# Patient Record
Sex: MALE | Race: WHITE
[De-identification: names, ages, dates, MRNs, and addresses within clinical notes are randomized per-mention and may not be internally consistent; named-entity substitution may affect disease eponyms.]

---

## 2018-05-26 ENCOUNTER — HOSPITAL ENCOUNTER (OUTPATIENT)
Dept: HOSPITAL 53 - M ADAMS | Age: 68
End: 2018-05-26
Attending: PHYSICIAN ASSISTANT
Payer: MEDICARE

## 2018-05-26 DIAGNOSIS — J20.9: Primary | ICD-10-CM

## 2018-05-26 DIAGNOSIS — R06.02: ICD-10-CM

## 2018-05-26 PROCEDURE — 71046 X-RAY EXAM CHEST 2 VIEWS: CPT

## 2019-03-19 ENCOUNTER — HOSPITAL ENCOUNTER (OUTPATIENT)
Dept: HOSPITAL 53 - M LAB REF | Age: 69
End: 2019-03-19
Attending: PHYSICIAN ASSISTANT
Payer: MEDICARE

## 2019-03-19 ENCOUNTER — HOSPITAL ENCOUNTER (OUTPATIENT)
Dept: HOSPITAL 53 - M ADAMS | Age: 69
End: 2019-03-19
Attending: PHYSICIAN ASSISTANT
Payer: MEDICARE

## 2019-03-19 DIAGNOSIS — J20.9: Primary | ICD-10-CM

## 2019-03-19 LAB
BASOPHILS # BLD AUTO: 0 10^3/UL (ref 0–0.2)
BASOPHILS NFR BLD AUTO: 0.2 % (ref 0–1)
EOSINOPHIL # BLD AUTO: 0 10^3/UL (ref 0–0.5)
EOSINOPHIL NFR BLD AUTO: 0 % (ref 0–3)
HCT VFR BLD AUTO: 52.4 % (ref 42–52)
HGB BLD-MCNC: 17.2 G/DL (ref 13.5–17.5)
LYMPHOCYTES # BLD AUTO: 1.4 10^3/UL (ref 1.5–4.5)
LYMPHOCYTES NFR BLD AUTO: 17.2 % (ref 24–44)
MCH RBC QN AUTO: 33.3 PG (ref 27–33)
MCHC RBC AUTO-ENTMCNC: 32.8 G/DL (ref 32–36.5)
MCV RBC AUTO: 101.4 FL (ref 80–96)
MONOCYTES # BLD AUTO: 0.6 10^3/UL (ref 0–0.8)
MONOCYTES NFR BLD AUTO: 7.1 % (ref 0–5)
NEUTROPHILS # BLD AUTO: 6.1 10^3/UL (ref 1.8–7.7)
NEUTROPHILS NFR BLD AUTO: 75.1 % (ref 36–66)
PLATELET # BLD AUTO: 181 10^3/UL (ref 150–450)
RBC # BLD AUTO: 5.17 10^6/UL (ref 4.3–6.1)
WBC # BLD AUTO: 8.1 10^3/UL (ref 4–10)

## 2019-03-19 NOTE — REP
Chest two views

 

HISTORY: Acute bronchitis

 

Comparison: 05/26/2018

 

The lungs are clear. There is blunting of the left costophrenic angle due to

pleural thickening. The heart is normal in size.  The pulmonary vasculature is

normal in appearance.  The bony structure is intact.

 

IMPRESSION:  No acute disease.

 

 

Electronically Signed by

Mirza Fox MD 03/19/2019 12:03 P

## 2020-05-06 ENCOUNTER — HOSPITAL ENCOUNTER (EMERGENCY)
Dept: HOSPITAL 53 - M ED | Age: 70
Discharge: HOME | End: 2020-05-06
Payer: MEDICARE

## 2020-05-06 VITALS — SYSTOLIC BLOOD PRESSURE: 141 MMHG | DIASTOLIC BLOOD PRESSURE: 94 MMHG

## 2020-05-06 VITALS — HEIGHT: 74 IN | WEIGHT: 260.54 LBS | BODY MASS INDEX: 33.44 KG/M2

## 2020-05-06 DIAGNOSIS — R94.5: ICD-10-CM
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
## 2020-05-06 DIAGNOSIS — F17.200: ICD-10-CM

## 2020-05-06 DIAGNOSIS — G43.909: ICD-10-CM

## 2020-05-06 DIAGNOSIS — M51.37: Primary | ICD-10-CM
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
## 2020-05-06 DIAGNOSIS — K21.9: ICD-10-CM

## 2020-05-06 DIAGNOSIS — E86.0: ICD-10-CM

## 2020-05-06 DIAGNOSIS — S39.012A: ICD-10-CM

## 2020-05-06 LAB
BASOPHILS # BLD AUTO: 0.1 10^3/UL (ref 0–0.2)
BASOPHILS NFR BLD AUTO: 0.5 % (ref 0–1)
BUN SERPL-MCNC: 23 MG/DL (ref 7–18)
CALCIUM SERPL-MCNC: 9.4 MG/DL (ref 8.8–10.2)
CHLORIDE SERPL-SCNC: 108 MEQ/L (ref 98–107)
CK SERPL-CCNC: 171 U/L (ref 39–308)
CO2 SERPL-SCNC: 25 MEQ/L (ref 21–32)
CREAT SERPL-MCNC: 1.61 MG/DL (ref 0.7–1.3)
EOSINOPHIL # BLD AUTO: 0.1 10^3/UL (ref 0–0.5)
EOSINOPHIL NFR BLD AUTO: 0.5 % (ref 0–3)
GFR SERPL CREATININE-BSD FRML MDRD: 45.1 ML/MIN/{1.73_M2} (ref 42–?)
GLUCOSE SERPL-MCNC: 116 MG/DL (ref 70–100)
HCT VFR BLD AUTO: 53.1 % (ref 42–52)
HGB BLD-MCNC: 17.8 G/DL (ref 13.5–17.5)
LYMPHOCYTES # BLD AUTO: 1.5 10^3/UL (ref 1.5–5)
LYMPHOCYTES NFR BLD AUTO: 12.3 % (ref 24–44)
MCH RBC QN AUTO: 33.4 PG (ref 27–33)
MCHC RBC AUTO-ENTMCNC: 33.5 G/DL (ref 32–36.5)
MCV RBC AUTO: 99.6 FL (ref 80–96)
MONOCYTES # BLD AUTO: 0.6 10^3/UL (ref 0–0.8)
MONOCYTES NFR BLD AUTO: 5.3 % (ref 0–5)
NEUTROPHILS # BLD AUTO: 9.7 10^3/UL (ref 1.5–8.5)
NEUTROPHILS NFR BLD AUTO: 80.8 % (ref 36–66)
PLATELET # BLD AUTO: 109 10^3/UL (ref 150–450)
POTASSIUM SERPL-SCNC: 4.3 MEQ/L (ref 3.5–5.1)
POTASSIUM SERPL-SCNC: 5.8 MEQ/L (ref 3.5–5.1)
RBC # BLD AUTO: 5.33 10^6/UL (ref 4.3–6.1)
SODIUM SERPL-SCNC: 140 MEQ/L (ref 136–145)
VIT B12 SERPL-MCNC: 448 PG/ML (ref 247–911)
WBC # BLD AUTO: 11.9 10^3/UL (ref 4–10)

## 2020-05-06 NOTE — REP
LUMBAR SPINE SERIES:  FIVE VIEWS.

 

HISTORY:  Low back pain and tingling in the feet.

 

FINDINGS:  Lumbar vertebral body heights are preserved.  There is straightening

of the normal lumbar lordosis.  There is degenerative disc narrowing at L3-4, and

to a lesser extent, at L4-5 with discogenic spurring noted, particularly at L3-4.

There is evidence of calcification of the margin of a posteriorly bulging disc at

L3-4, and lateral film suggests spinal stenosis at L3-4.  Similar less pronounced

changes seen at L2-3.  No spondylolysis or spondylolisthesis is seen.  There is

some facet hypertrophy at L5-S1 and L4-5 bilaterally, mild in degree.  Sacrum and

SI joints are intact.  Psoas margins are symmetric.  The abdominal aorta appears

tortuous and may be ectatic.  It is faintly calcified.  There are clips in the

right upper quadrant of the abdomen.

 

IMPRESSION:

 

1.  Degenerative disc disease.  Findings suggestive of spinal stenosis at L3-4

and possibly at L2-3 due to bulging discs.

 

2.  Tortuous and possibly ectatic, faintly calcified abdominal aorta.

 

3.  No acute bony abnormality.

 

 

Electronically Signed by

Rich Lee MD 05/06/2020 06:47 P

## 2020-05-07 NOTE — ED PDOC
Post-Departure Follow-Up


dr osgeuera faxed formal report of ls spine for fu mlg Lundborg-Gray,Maja MD           May 7, 2020 13:03

## 2020-05-10 ENCOUNTER — HOSPITAL ENCOUNTER (INPATIENT)
Dept: HOSPITAL 53 - M ED | Age: 70
LOS: 2 days | Discharge: HOME | DRG: 641 | End: 2020-05-12
Attending: INTERNAL MEDICINE | Admitting: INTERNAL MEDICINE
Payer: MEDICARE

## 2020-05-10 VITALS — DIASTOLIC BLOOD PRESSURE: 77 MMHG | SYSTOLIC BLOOD PRESSURE: 158 MMHG

## 2020-05-10 VITALS — SYSTOLIC BLOOD PRESSURE: 119 MMHG | DIASTOLIC BLOOD PRESSURE: 73 MMHG

## 2020-05-10 VITALS — HEIGHT: 74 IN | WEIGHT: 251.33 LBS | BODY MASS INDEX: 32.25 KG/M2

## 2020-05-10 DIAGNOSIS — R19.7: ICD-10-CM

## 2020-05-10 DIAGNOSIS — N17.9: ICD-10-CM

## 2020-05-10 DIAGNOSIS — F17.200: ICD-10-CM

## 2020-05-10 DIAGNOSIS — E86.0: Primary | ICD-10-CM

## 2020-05-10 DIAGNOSIS — M62.81: ICD-10-CM

## 2020-05-10 DIAGNOSIS — I71.6: ICD-10-CM

## 2020-05-10 DIAGNOSIS — E66.9: ICD-10-CM

## 2020-05-10 DIAGNOSIS — I73.9: ICD-10-CM

## 2020-05-10 LAB
ALBUMIN SERPL BCG-MCNC: 3.3 GM/DL (ref 3.2–5.2)
ALT SERPL W P-5'-P-CCNC: 52 U/L (ref 12–78)
BASOPHILS # BLD AUTO: 0 10^3/UL (ref 0–0.2)
BASOPHILS NFR BLD AUTO: 0.4 % (ref 0–1)
BILIRUB CONJ SERPL-MCNC: 0.2 MG/DL (ref 0–0.2)
BILIRUB SERPL-MCNC: 0.7 MG/DL (ref 0.2–1)
BUN SERPL-MCNC: 39 MG/DL (ref 7–18)
BUN SERPL-MCNC: 44 MG/DL (ref 7–18)
CALCIUM SERPL-MCNC: 8.6 MG/DL (ref 8.8–10.2)
CALCIUM SERPL-MCNC: 8.8 MG/DL (ref 8.8–10.2)
CHLORIDE SERPL-SCNC: 107 MEQ/L (ref 98–107)
CHLORIDE SERPL-SCNC: 111 MEQ/L (ref 98–107)
CHOLEST SERPL-MCNC: 172 MG/DL (ref ?–200)
CHOLEST/HDLC SERPL: 4.78 {RATIO} (ref ?–5)
CK MB CFR.DF SERPL CALC: 0.63
CK MB SERPL-MCNC: 1.4 NG/ML (ref ?–3.6)
CK SERPL-CCNC: 222 U/L (ref 39–308)
CO2 SERPL-SCNC: 25 MEQ/L (ref 21–32)
CO2 SERPL-SCNC: 26 MEQ/L (ref 21–32)
CREAT SERPL-MCNC: 2.42 MG/DL (ref 0.7–1.3)
CREAT SERPL-MCNC: 2.62 MG/DL (ref 0.7–1.3)
CREAT UR-MCNC: 201 MG/DL
EOSINOPHIL # BLD AUTO: 0.4 10^3/UL (ref 0–0.5)
EOSINOPHIL NFR BLD AUTO: 4.2 % (ref 0–3)
EST. AVERAGE GLUCOSE BLD GHB EST-MCNC: 111 MG/DL (ref 60–110)
GFR SERPL CREATININE-BSD FRML MDRD: 25.7 ML/MIN/{1.73_M2} (ref 42–?)
GFR SERPL CREATININE-BSD FRML MDRD: 28.2 ML/MIN/{1.73_M2} (ref 42–?)
GLUCOSE SERPL-MCNC: 104 MG/DL (ref 70–100)
GLUCOSE SERPL-MCNC: 97 MG/DL (ref 70–100)
HCT VFR BLD AUTO: 47.2 % (ref 42–52)
HDLC SERPL-MCNC: 36 MG/DL (ref 40–?)
HGB BLD-MCNC: 16.1 G/DL (ref 13.5–17.5)
LDLC SERPL CALC-MCNC: 108 MG/DL (ref ?–100)
LYMPHOCYTES # BLD AUTO: 2 10^3/UL (ref 1.5–5)
LYMPHOCYTES NFR BLD AUTO: 21.5 % (ref 24–44)
MAGNESIUM SERPL-MCNC: 2.1 MG/DL (ref 1.8–2.4)
MCH RBC QN AUTO: 33.1 PG (ref 27–33)
MCHC RBC AUTO-ENTMCNC: 34.1 G/DL (ref 32–36.5)
MCV RBC AUTO: 97.1 FL (ref 80–96)
MONOCYTES # BLD AUTO: 0.8 10^3/UL (ref 0–0.8)
MONOCYTES NFR BLD AUTO: 8.4 % (ref 0–5)
NEUTROPHILS # BLD AUTO: 6 10^3/UL (ref 1.5–8.5)
NEUTROPHILS NFR BLD AUTO: 65 % (ref 36–66)
NONHDLC SERPL-MCNC: 136 MG/DL
OSMOLALITY SERPL: 296 MOSM/KG (ref 280–301)
OSMOLALITY UR: 703 MOSM/KG (ref 500–800)
PLATELET # BLD AUTO: 127 10^3/UL (ref 150–450)
POTASSIUM SERPL-SCNC: 4.2 MEQ/L (ref 3.5–5.1)
POTASSIUM SERPL-SCNC: 4.6 MEQ/L (ref 3.5–5.1)
PROT SERPL-MCNC: 6.9 GM/DL (ref 6.4–8.2)
RBC # BLD AUTO: 4.86 10^6/UL (ref 4.3–6.1)
SODIUM SERPL-SCNC: 138 MEQ/L (ref 136–145)
SODIUM SERPL-SCNC: 142 MEQ/L (ref 136–145)
SODIUM UR-SCNC: 54 MEQ/L
TRIGL SERPL-MCNC: 138 MG/DL (ref ?–150)
TROPONIN I SERPL-MCNC: < 0.02 NG/ML (ref ?–0.1)
UUN UR-MCNC: 1247 MG/DL
WBC # BLD AUTO: 9.2 10^3/UL (ref 4–10)

## 2020-05-10 RX ADMIN — SODIUM CHLORIDE SCH MLS/HR: 9 INJECTION, SOLUTION INTRAVENOUS at 15:35

## 2020-05-10 RX ADMIN — SODIUM CHLORIDE SCH MLS/HR: 9 INJECTION, SOLUTION INTRAVENOUS at 15:34

## 2020-05-10 NOTE — REP
REASON:  Acute renal failure.

 

There are no priors for comparison.

 

Prior contrast-enhanced chest CT, 07/10/2010, was reviewed.

 

The descending thoracic aorta seen on this abdominal CT is massively ectatic, now

measuring 6.5 cm, previously under 3 cm on the imaged portion seen on the prior

exam. In addition, the aneurysm continues to just proximal to the aortoiliac

bifurcation. At the bifurcation, the maximal AP dimension is 3.4 cm. Without

intravenous contrast, I cannot assess properly for an acute aortic dissection. I

suspect there is an acute aortic dissection, but contrast would be necessary for

further evaluation.

 

Limited evaluation of the liver, spleen, pancreas, adrenal glands, and kidneys

show no gross abnormalities. Limited evaluation of the bowel loops and their

mesenteries show no gross abnormalities. No free fluid or free air is seen in the

abdomen or pelvis. There is sigmoid colon diverticulosis. There is no evidence of

an intrapelvic or intra-abdominal mass or adenopathy.

 

Bone window technique throughout the examination shows the osseous structures to

be within normal limits.

 

The lung bases are clear.

 

IMPRESSION:

 

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

aortic dissecting aneurysm. Contrast-enhanced examination is recommended. The

finding might be critical.

 

These findings were discussed with Manuel Sin, the patient's healthcare

provider at the time of this dictation.

 

A follow up phone call was placed to Mr. Sin who relayed that Dr. Noriega will evaluate the patient in the ER at this time and further

determination on whether they wish to order a contrast enhanced examination will

be made.

 

 

Electronically Signed by

Lance Rao DO 05/10/2020 03:39 P

## 2020-05-10 NOTE — REPVR
PROCEDURE INFORMATION: 

Exam: US Duplex Artery and Vein of the Abdominal and/or Reproductive Organs, 

Complete 

Exam date and time: 5/10/2020 5:37 PM 

Age: 70 years old 

Clinical indication: Abnormal findings; Abnormal lab test; Abnormal bun &/or 

creatinine; Additional info: [facility] 



TECHNIQUE: 

Imaging protocol: Real-time duplex ultrasound scan of the arterial and venous 

flow of the abdominal and/or reproductive organs with B-mode, color Doppler 

flow and spectral waveform analysis with image documentation. Exam focused on 

the region of clinical concern. Complete exam. Duplex images required to 

evaluate vascular conditions. 



COMPARISON: 

No relevant prior studies available. 



FINDINGS: 



FINDINGS:

Aortic Doppler: Aorta at the level of the renal arteries: PSV is 45 cm/s.



Right renal Doppler:

 The right kidney is 12.6 cm in length.

 Renal artery:

   Origin/proximal: PSV 74 cm/s. 

   Mid: PSV 70 cm/s. 

   Distal: PSV 61 cm/s. 

 Parenchyma: Segmental:  Resistive indices are between 0.65 and 0.69.

 Acceleration times are between 0.42 and 0.50 s. Rounded intrarenal waveforms.

 Renal vein: Patent.

  RAR is 1.6.



Left renal Doppler:

 The left kidney is 12.9 cm in length.

 Renal artery:

   Origin/proximal:  cm/s. 

   Mid:  cm/s. 

   Distal: PSV 95 cm/s. 

 Parenchyma: Segmental: Resistive indices are between and 0.61 and 0.65.

 Acceleration times are between 0.42 and 0.67 s. Rounded intrarenal waveforms.

 Renal vein: Patent.

 RAR is 2.7.



Reference:

 Renal artery peak systolic velocities (PSV). Normal is less than 180 cm/s.

 Resistive indices (RI). Normal value is ~0.60.  The upper limit of normal is 

0.70. 

 RAR (renal to aortic ratio). Normal is <3.5.



IMPRESSION: . 

No imaging findings to suggest renovascular hypertension.



Electronically signed by: Heriberto Hyde On 05/10/2020  18:08:55 PM

## 2020-05-10 NOTE — HPEPDOC
General


Date of Admission


5/10/2020


Date of Service:  May 10, 2020


Chief Complaint


The patient is a 70-year-old male who presented to the hospital for follow up 

lab work.





History of Present Illness


Patient is a 70-year-old  male with no significant past medical history

and has not seen a provider in greater than 10 years. . He initially presented 

to the ER on 5/6/2020 with complaints of worsening weakness and cramping of both

his lower extremities and back pain. 





Patient reports that at the time he was doing yard work in the backyard 

involving cutting tree branches manually. Patient noted that he was experiencing

cramping of his legs after he sat down, soon progressed to worsening weakness of

his lower extremities and pain. Patient came to the emergency room for further 

evaluation and workup. Upon arrival to emergency room, patient had reported that

his strength has improved and pain was resolving.





Patient had lab work completed that had revealed a creatinine of 1.6 and a 

normal CK level. Patient was advised to remain inpatient for IV fluid hydration.

However, he refused to stay and was advised to follow up for repeat lab work on 

Friday. 





Patient reported that on Thursday to Friday. He was experiencing diarrhea about 

5-10 episodes which prevented him from returning back for follow-up on Friday. 

His niece, who is a registered nurse advised him to return to the hospital for 

follow-up lab work today (Charlie 5/10). 





Upon arrival repeat lab work had revealed worsening creatinine to 2.6 with still

normal CK. Patient had reported that today he was experiencing some 

discoloration of his right second digit toe. Patient had a CT scan of his 

abdomen, pelvis completed without contrast to evaluate his kidneys; report was 

pending.





Hospitalist service was contacted for evaluation and admission for acute kidney 

injury.





Currently patient denies any headache, nausea, vomiting, chest pain, shortness 

of breath, any significant change in his baseline cough. He denies any 

palpitations. Denies any active diarrhea. Denies any constipation or urinary 

discomfort. Patient does report decreased urination. . He also notes that he 

hasnt been consuming much fluid over the last several days. Denies any fevers  

/ chills.





Patient has noted that for his back pain, over last several days, hes been 

taking Aleve, Excedrin and Tylenol. Currently, he reports that his back pain has

significantly improved but not resolved.





Patient reports his appetite is currently normal. Denies any significant changes

in his weight. He does not have a primary care provider is in the process of 

establishing care with Dr. Dumas.





After evaluation, preliminary results of CT scan became available and show 

possibility of dissecting aortic aneurysm. Case was discussed with Dr. Noriega and images were reviewed; unlikely that this is acute, however no 

emergent intervention at this time.





Home Medications


No Active Prescriptions or Reported Meds





Allergies


Coded Allergies:  


     No Known Allergies (Verified , 7/6/10)





Past Medical History


Medical History


Patient reports a remote history of pneumonia, pleurisy greater than 10 years 

ago


Surgical History


Cholecystectomy


Appendectomy





Family History


- Family history was reviewed and is currently noncontributory


- No history of malignancies





Social History


- Denies the use of alcohol, tobacco or illicit drugs


- Denies recent travel or sick contacts


- Lives with wife


- Occupation; owned a paving business





Review of Systems


Other systems


10 point review of systems complete, all negative otherwise stated in HPI





Vital Signs


- Vitals: /73, HR 73, RR 20, Sat 98%RA, Temp 97.9F


- General: Lying in bed, Does not appear to be in any distress, Speaking in full

sentences, AAOx3


- HEENT: NC, AT, PERRLA, EOMI


- CVS: RRR, +S1S2


- Lungs: Fair air entry bilaterally, No appreciable wheezing / rales / rhonchi 


- Abdomen: Soft, Non-distended, Non-tender


- Extremities: No lower extremity edema, No calf tenderness


- Neuro: No focal motor or sensory deficit


- Skin: No visible rashes





Laboratory Data


Labs 24H


Laboratory Tests 2


5/10/20 13:24: 


Immature Granulocyte % (Auto) 0.5, Neutrophils (%) (Auto) 65.0, Lymphocytes (%) 

(Auto) 21.5L, Monocytes (%) (Auto) 8.4H, Eosinophils (%) (Auto) 4.2H, Basophils 

(%) (Auto) 0.4, Neutrophils # (Auto) 6.0, Lymphocytes # (Auto) 2.0, Monocytes # 

(Auto) 0.8, Eosinophils # (Auto) 0.4, Basophils # (Auto) 0.0, Nucleated Red 

Blood Cells % (auto) 0.0, Anion Gap 5L, Glomerular Filtration Rate 25.7L, 

Osmolality 296, Lactic Acid Level 1.2, Calcium Level 8.8, Total Bilirubin 0.7, 

Direct Bilirubin 0.2, Aspartate Amino Transf (AST/SGOT) 36, Alanine 

Aminotransferase (ALT/SGPT) 52, Alkaline Phosphatase 76, Total Creatine Kinase 

222, Creatine Kinase MB 1.4, Creatine Kinase MB Relative Index 0.63, Troponin I 

< 0.02, Total Protein 6.9, Albumin 3.3, Albumin/Globulin Ratio 0.92L


5/10/20 14:02: Urine Random Osmolality 703


5/10/20 14:05: 


Urine Color YELLOW, Urine Appearance HAZY, Urine pH 5.0, Urine Specific Gravity 

1.020, Urine Protein 2+H, Urine Glucose (UA) NEGATIVE, Urine Ketones NEGATIVE, 

Urine Blood 2+H, Urine Nitrite NEGATIVE, Urine Bilirubin NEGATIVE, Urine 

Urobilinogen 0.2, Urine Leukocyte Esterase NEGATIVE, Urine WBC (Auto) 2, Urine 

RBC (Auto) 3, Urine Hyaline Casts (Auto) 0, Urine Bacteria (Auto) NEGATIVE, 

Urine Squamous Epithelial Cells 0, Urine Amorphous Sediment SMALLH, Urine Mucus 

(Auto) SMALL, Urine Sperm (Auto)


CBC/BMP


Laboratory Tests


5/10/20 13:24











Plan / VTE


VTE Prophylaxis Ordered?:  Yes





Plan


Plan


Abdominal aortic aneurysm; possibly with dissection based on imaging 


- Remains hemodynamically stable 


- Currently has improvement of back pain 


- CT abdomen / pelvis without contrast (re: elevated Cr); preliminary report 

reveals possibly aortic aneurysm dissection


- Will ensure BP and HR remain optimized 


- Case was discussed with Dr. Noriega who will also be on consultation; 

appreciate their input


- At this point; does not require emergent intervention given no significant 

match to clinical picture  (ie: severely elevated blood pressures with 

intolerable pain)





Acute kidney injury - possibly 2/2 pre-renal 2/2 poor oral intake / diarrhea, 

possibly 2/2 reduced blood flow, possibly 2/2 intra-renal etiology 2/2 chronic 

renal disease


- Cr baseline unclear; last Cr compared against was 1.0 in 2010


- Cr on 5/6 on 1.6; has deteriorated since that point to 2.6 currently 


- After checking urine electrolytes, FENa: 0.5%


- Will check renal US and renal doppler flow 


- Will avoid nephrotoxic medications


- s/p 1.5 Liters of NS in ER


- Will c/w IV fluid hydration 





Bilateral LE weakness - possibly 2/2 PVD


- Presented on 5/6/2020 with back pain and progressive LE weakness / pain


- Physical does reveal pulses at bilateral lower extremities


- Will check duplex arterial US of bilateral LE 





Nicotine dependence


- Patient has been strongly advised for nicotine / smoking cessation 





Obesity 


- BMI 30.9


- Complicating medical care





DVT prophylaxis 


- Will start TEDs/[facility]











SHARI SPEAR MD                May 10, 2020 15:43

## 2020-05-11 VITALS — SYSTOLIC BLOOD PRESSURE: 131 MMHG | DIASTOLIC BLOOD PRESSURE: 76 MMHG

## 2020-05-11 VITALS — SYSTOLIC BLOOD PRESSURE: 122 MMHG | DIASTOLIC BLOOD PRESSURE: 71 MMHG

## 2020-05-11 VITALS — SYSTOLIC BLOOD PRESSURE: 122 MMHG | DIASTOLIC BLOOD PRESSURE: 72 MMHG

## 2020-05-11 VITALS — SYSTOLIC BLOOD PRESSURE: 129 MMHG | DIASTOLIC BLOOD PRESSURE: 73 MMHG

## 2020-05-11 VITALS — DIASTOLIC BLOOD PRESSURE: 71 MMHG | SYSTOLIC BLOOD PRESSURE: 121 MMHG

## 2020-05-11 VITALS — SYSTOLIC BLOOD PRESSURE: 116 MMHG | DIASTOLIC BLOOD PRESSURE: 77 MMHG

## 2020-05-11 LAB
BASOPHILS # BLD AUTO: 0.1 10^3/UL (ref 0–0.2)
BASOPHILS NFR BLD AUTO: 0.6 % (ref 0–1)
BUN SERPL-MCNC: 36 MG/DL (ref 7–18)
BUN SERPL-MCNC: 39 MG/DL (ref 7–18)
CALCIUM SERPL-MCNC: 8.1 MG/DL (ref 8.8–10.2)
CALCIUM SERPL-MCNC: 8.3 MG/DL (ref 8.8–10.2)
CHLORIDE SERPL-SCNC: 111 MEQ/L (ref 98–107)
CHLORIDE SERPL-SCNC: 115 MEQ/L (ref 98–107)
CO2 SERPL-SCNC: 26 MEQ/L (ref 21–32)
CO2 SERPL-SCNC: 26 MEQ/L (ref 21–32)
CREAT SERPL-MCNC: 2.24 MG/DL (ref 0.7–1.3)
CREAT SERPL-MCNC: 2.34 MG/DL (ref 0.7–1.3)
EOSINOPHIL # BLD AUTO: 0.4 10^3/UL (ref 0–0.5)
EOSINOPHIL NFR BLD AUTO: 5.1 % (ref 0–3)
GFR SERPL CREATININE-BSD FRML MDRD: 29.3 ML/MIN/{1.73_M2} (ref 42–?)
GFR SERPL CREATININE-BSD FRML MDRD: 30.8 ML/MIN/{1.73_M2} (ref 42–?)
GLUCOSE SERPL-MCNC: 103 MG/DL (ref 70–100)
GLUCOSE SERPL-MCNC: 92 MG/DL (ref 70–100)
HCT VFR BLD AUTO: 41.4 % (ref 42–52)
HGB BLD-MCNC: 14 G/DL (ref 13.5–17.5)
LYMPHOCYTES # BLD AUTO: 2.1 10^3/UL (ref 1.5–5)
LYMPHOCYTES NFR BLD AUTO: 26.7 % (ref 24–44)
MAGNESIUM SERPL-MCNC: 2.1 MG/DL (ref 1.8–2.4)
MCH RBC QN AUTO: 33.8 PG (ref 27–33)
MCHC RBC AUTO-ENTMCNC: 33.8 G/DL (ref 32–36.5)
MCV RBC AUTO: 100 FL (ref 80–96)
MONOCYTES # BLD AUTO: 0.8 10^3/UL (ref 0–0.8)
MONOCYTES NFR BLD AUTO: 9.8 % (ref 0–5)
NEUTROPHILS # BLD AUTO: 4.6 10^3/UL (ref 1.5–8.5)
NEUTROPHILS NFR BLD AUTO: 57.4 % (ref 36–66)
PLATELET # BLD AUTO: 141 10^3/UL (ref 150–450)
POTASSIUM SERPL-SCNC: 4.8 MEQ/L (ref 3.5–5.1)
POTASSIUM SERPL-SCNC: 4.8 MEQ/L (ref 3.5–5.1)
RBC # BLD AUTO: 4.14 10^6/UL (ref 4.3–6.1)
SODIUM SERPL-SCNC: 142 MEQ/L (ref 136–145)
SODIUM SERPL-SCNC: 144 MEQ/L (ref 136–145)
WBC # BLD AUTO: 8 10^3/UL (ref 4–10)

## 2020-05-11 RX ADMIN — SODIUM CHLORIDE SCH MLS/HR: 9 INJECTION, SOLUTION INTRAVENOUS at 09:57

## 2020-05-11 RX ADMIN — SODIUM CHLORIDE SCH MLS/HR: 9 INJECTION, SOLUTION INTRAVENOUS at 15:58

## 2020-05-11 RX ADMIN — SODIUM CHLORIDE SCH MLS/HR: 9 INJECTION, SOLUTION INTRAVENOUS at 21:53

## 2020-05-11 RX ADMIN — SODIUM CHLORIDE SCH MLS/HR: 9 INJECTION, SOLUTION INTRAVENOUS at 03:39

## 2020-05-11 NOTE — CR.PDOC
General


Date of Consultation:  May 11, 2020





Consultation


REASON FOR CONSULTATION/CHIEF COMPLAINT: Incidental finding of thoracoabdominal 

aneurysm on noncontrast CT abdomen and pelvis





HISTORY OF PRESENT ILLNESS: This is a very pleasant 70-year-old gentleman who 

was admitted for worsening renal insufficiency and had a noncontrast CT of the 

abdomen and pelvis in the ER during his workup. Incidentally, a finding of 6.5 

cm thoracoabdominal aneurysm prompted a vascular surgery consult. There was 

concern by the radiologist that the patient may be having an acute dissection. I

reassured both the ER physician and the hospitalist that with systolic blood 

pressure of 120, normotensive, and little to no back pain, and acute dissection 

would be unlikely. Also, it is less likely to have dissection acutely and an 

aneurysmal vessel, but more likely that he may have had a dissection in the past

that led to an aneurysmal vessel. It is possible he did have a dissection at one

point, but we cannot tell this from the noncontrast CT. The subtle changes in 

grayscale within the aorta could be due to a dissection, could be due to mural 

thrombus, could be due to blood circulation. We will need an contrast CT of the 

chest abdomen and pelvis to fully evaluate the thoracoabdominal aneurysm. This 

can be done once the patient's renal function improves. To rule out limited 

bloodflow to one or both kidneys as a source of renal insufficiency we obtained 

a renal duplex ultrasound. There was good flow to both kidneys. I believe the 

patient's renal function is more likely related to dehydration, worsened after 

days of diarrhea, and will hopefully it will improve with supportive care, IV 

fluids, and if needed a nephrology consult. We will continue to follow along. I 

discussed the natural history of aortic aneurysms with the patient. He said he 

is familiar with the phenomenon as he has had friends who've had aortic 

aneurysms, some that of ruptured. He also describes experience with aneurysm 

rupture in cows, which I have not heard of, but was interesting to discuss. 

Additionally, I did review the patient's arterial duplex. He has triphasic flow 

to the knee bilaterally, but some tibial disease bilaterally, worse on the 

right. The distal anterior tibial artery is not visualized, which may be 

contributing to the discoloration in the right second toe, as this is the 

Angiosome for the dorsal foot and second toe. However, overall the patient's A

BIs are greater than 1 bilaterally and he seems to have more than adequate flow 

with very warm feet and less than 1 second capillary refill on exam.





ALLERGIES: Please see below.





HOME MEDICATIONS: Please see below.





PAST MEDICAL HISTORY:


1. Tobacco abuse


2. Peripheral vascular disease


3. Thoracoabdominal aneurysm





PAST SURGICAL HISTORY: 


1. Cholecystectomy


2. Appendectomy





FAMILY HISTORY: Heart disease 





SOCIAL HISTORY:


Patient lives alone. He was smoking regularly up until this admission. He o

ccasionally drinks wine, but not every day. He denies illicit drug use.





REVIEW OF SYSTEMS:


CONSTITUTIONAL: Positive fatigue


HEENT: No new vision changes


CARDIOVASCULAR: No chest pain


RESPIRATORY: Occasional shortness of breath with exertion, occasional cough


GENITOURINARY: No dysuria


MUSCULOSKELETAL: Occasional back pain, blue discoloration right second toe


GASTROINTESTINAL: Denies nausea vomiting constipation. Positive recent episodes 

of diarrhea


SKIN: Denies rashes or skin cancer


NEUROLOGICAL: Denies headaches or seizures. Denies TIA or CVA


PSYCHIATRIC: Denies anxiety or depression


ENDOCRINE: Denies diabetes or thyroid disease


HEMATOLOGIC/LYMPHATIC: Denies anemia


ALLERGIC/IMMUNOLOGIC: Denies immunologic disease is





PHYSICAL EXAMINATION:


VITAL SIGNS: Please see below.


GENERAL APPEARANCE: Medically stable.


HEENT: Normocephalic. Poor dentition.


RESPIRATORY: Slightly coarse breath sounds bilaterally. No wheezes auscultated.


CARDIOVASCULAR: Regular rate and rhythm.


ABDOMEN: Soft nontender nondistended.


EXTREMITIES: Bilateral lower extremities are warm and well perfused. The pulses 

are not palpable but he has signals at the DP and PT bilaterally: Monophasic 

right TP, triphasic right PT, biphasic left DP, biphasic left PT. The right 

second toe does in fact have some distal blue discoloration, but is minimally 

tender. The other toes do not appear to be discolored. No wounds or ulcers are 

noted.


NEUROLOGICAL: Alert and oriented 3. Moves all extremities equally. No focal 

deficits noted.


PSYCHIATRIC: Pleasant and cooperative with the exam.





LABORATORY DATA: Please see below.





ASSESSMENT/PLAN: Very pleasant 70-year-old gentleman with incidental finding of 

6.5 cm thoracoabdominal aortic aneurysm


1. Once renal function has improved, the patient will need a CTA of the chest 

abdomen and pelvis to fully evaluate the extent of the thoracoabdominal 

aneurysm.


2. For now, no urgent imaging is needed. Eventually, if the patient requires 

repair, we will set him up with a referral to [facility].


3. Regarding his right second toe, he is fairly asymptomatic. He has no 

ulceration present. This could be related to an embolic event or it could be 

related to chronic vascular disease in the right anterior tibial artery. At this

point, I do not recommend urgent intervention has any endovascular interrogation

we would perform would require contrast and the patient has acute renal failure.

However, if his toe does not heal, if he develops ulcerations, if he develops 

worsening pain or starts to develop claudication, we could certainly perform an 

arteriogram at a later date and see if there is anything we can improve in his 

distal flow. No urgency at this time. 


4. We would recommend aspirin and Plavix daily for life for chronic vascular 

disease.


5. Strongly recommend discontinuing nicotine patch. Instead, would recommend 

Chantix or Wellbutrin as pharmacologic aid to smoking cessation as needed. Also 

anxiety medicine can be prescribed while the patient is in the hospital. Nicotin

e patches cause severe vasoconstriction of the distal fingers and toes, hands 

and feet. It is continuous vasoconstriction, unlike smoking which provides 

breaks between nicotine doses. A 21 mg nicotine patch is extremely detrimental 

to someone with any kind of peripheral vascular disease.





We appreciate the opportunity to participate in the care of this patient.





Vital Signs/I&O





Vital Signs








  Date Time  Temp Pulse Resp B/P (MAP) Pulse Ox O2 Delivery O2 Flow Rate FiO2


 


5/11/20 04:00 97.8 71 19 131/76 (94) 99 Room Air  














I&O- Last 24 Hours up to 6 AM 


 


 5/11/20





 05:59


 


Intake Total 2820 ml


 


Output Total 600 ml


 


Balance 2220 ml











Laboratory Data


Labs 24H


Laboratory Tests 2


5/10/20 13:24: 


Immature Granulocyte % (Auto) 0.5, Neutrophils (%) (Auto) 65.0, Lymphocytes (%) 

(Auto) 21.5L, Monocytes (%) (Auto) 8.4H, Eosinophils (%) (Auto) 4.2H, Basophils 

(%) (Auto) 0.4, Neutrophils # (Auto) 6.0, Lymphocytes # (Auto) 2.0, Monocytes # 

(Auto) 0.8, Eosinophils # (Auto) 0.4, Basophils # (Auto) 0.0, Nucleated Red 

Blood Cells % (auto) 0.0, Anion Gap 5L, Glomerular Filtration Rate 25.7L, 

Estimated Mean Plasma Glucose 111H, Hemoglobin A1c 5.5, Osmolality 296, Lactic 

Acid Level 1.2, Calcium Level 8.8, Total Bilirubin 0.7, Direct Bilirubin 0.2, 

Aspartate Amino Transf (AST/SGOT) 36, Alanine Aminotransferase (ALT/SGPT) 52, 

Alkaline Phosphatase 76, Total Creatine Kinase 222, Creatine Kinase MB 1.4, 

Creatine Kinase MB Relative Index 0.63, Troponin I < 0.02, Total Protein 6.9, 

Albumin 3.3, Albumin/Globulin Ratio 0.92L, Triglycerides Level 138, Total 

Cholesterol 172, LDL Cholesterol 108H, Non-HDL Cholesterol (LDL + VLDL) 136, 

Total HDL Cholesterol 36L, Cholesterol/HDL Ratio 4.777


5/10/20 14:02: 


Urine Random Osmolality 703, Urine Random Creatinine 201.0, Urine Random Sodium 

54, Urine Random Urea Nitrogen 1247


5/10/20 14:05: 


Urine Color YELLOW, Urine Appearance HAZY, Urine pH 5.0, Urine Specific Gravity 

1.020, Urine Protein 2+H, Urine Glucose (UA) NEGATIVE, Urine Ketones NEGATIVE, 

Urine Blood 2+H, Urine Nitrite NEGATIVE, Urine Bilirubin NEGATIVE, Urine 

Urobilinogen 0.2, Urine Leukocyte Esterase NEGATIVE, Urine WBC (Auto) 2, Urine 

RBC (Auto) 3, Urine Hyaline Casts (Auto) 0, Urine Bacteria (Auto) NEGATIVE, 

Urine Squamous Epithelial Cells 0, Urine Amorphous Sediment SMALLH, Urine Mucus 

(Auto) SMALL, Urine Sperm (Auto) 


5/10/20 18:18: 


Anion Gap 6L, Glomerular Filtration Rate 28.2L, Calcium Level 8.6L, Magnesium 

Level 2.1


5/11/20 05:23: 


Immature Granulocyte % (Auto) 0.4, Neutrophils (%) (Auto) 57.4, Lymphocytes (%) 

(Auto) 26.7, Monocytes (%) (Auto) 9.8H, Eosinophils (%) (Auto) 5.1H, Basophils 

(%) (Auto) 0.6, Neutrophils # (Auto) 4.6, Lymphocytes # (Auto) 2.1, Monocytes # 

(Auto) 0.8, Eosinophils # (Auto) 0.4, Basophils # (Auto) 0.1, Nucleated Red 

Blood Cells % (auto) 0.0, Anion Gap 5L, Glomerular Filtration Rate 29.3L, 

Calcium Level 8.3L, Magnesium Level 2.1


CBC/BMP


Laboratory Tests


5/10/20 13:24








5/10/20 18:18








5/11/20 05:23











Allergies


Coded Allergies:  


     No Known Allergies (Verified , 7/6/10)





Home Medications


No Active Prescriptions or Reported Meds











CASSANDRA CAUSEY MD         May 11, 2020 08:00

## 2020-05-11 NOTE — REP
Bilateral lower extremity duplex arterial ultrasound:

 

History:  Claudication.

 

Findings:  Ankle brachial indices are normal measured at 1.35 on the right and

1.15 on the left.  Mild plaquing is seen.  No significant stenosis is seen.  In

the right lower extremity, the distal anterior tibial artery is not seen.  A

large vessel laterally feet.  Foot likely from peroneal branch.  Study is

otherwise unremarkable.

 

Velocity chart right lower extremity arteries:

Right CF A PSV 55 cm/S

Profunda 48

Proximal SFA 79

Mid SFA 63

Distal SFA 64

Popliteal 61

Proximal PTA 25

Tibioperoneal trunk 81

Proximal PTA 64

Distal PTA 78

Distal AT A not seen.

 

Velocity chart left lower extremity arteries:

Left CF A PSV 60 cm/S

Profunda 52

Proximal SFA 69

Mid SFA 70

Distal SFA 52

Popliteal 56

Proximal AT A 77

Tibioperoneal trunk 41

Proximal PTA 57

Proximal PTA 63

Distal AT A 62

 

 

Electronically Signed by

Rich Lee MD 05/11/2020 10:19 A

## 2020-05-11 NOTE — IPNPDOC
Text Note


Date of Service


The patient was seen on 5/11/20.





NOTE


Subjective:


Patient is a 70-year-old  male with no significant past medical history

and has not seen a provider in greater than 10 years. . He initially presented 

to the ER on 5/6/2020 with complaints of worsening weakness and cramping of both

his lower extremities and back pain. 





Patient reports that at the time he was doing yard work in the backyard 

involving cutting tree branches manually. Patient noted that he was experiencing

cramping of his legs after he sat down, soon progressed to worsening weakness of

his lower extremities and pain. Patient came to the emergency room for further 

evaluation and workup. Upon arrival to emergency room, patient had reported that

his strength has improved and pain was resolving.





Patient had lab work completed that had revealed a creatinine of 1.6 and a 

normal CK level. Patient was advised to remain inpatient for IV fluid hydration.

However, he refused to stay and was advised to follow up for repeat lab work on 

Friday. 





Patient reported that on Thursday to Friday. He was experiencing diarrhea about 

5-10 episodes which prevented him from returning back for follow-up on Friday. 

His niece, who is a registered nurse advised him to return to the hospital for 

follow-up lab work today (Charlie 5/10). 





Upon arrival repeat lab work had revealed worsening creatinine to 2.6 with still

normal CK. Patient had reported that today he was experiencing some 

discoloration of his right second digit toe. Patient had a CT scan of his 

abdomen, pelvis completed without contrast to evaluate his kidneys; report was 

pending.





Hospitalist service was contacted for evaluation and admission for acute kidney 

injury.





After evaluation, preliminary results of CT scan became available and show 

possibility of dissecting aortic aneurysm. Case was discussed with Dr. Noriega and images were reviewed; unlikely that this is acute, however no 

emergent intervention at this time.





Patient was seen and examined at the bedside this morning. Patient reports they 

do not experiencing chest pain, shortness breath, palpitations. No significant 

cough, nausea, vomiting, abdominal pain, diarrhea, or urinary discomfort. They 

do report some difficulty with urination. They report that they're getting 

tired, sitting in bed all day.





Objective:


Vitals (See below)


General: Lying in bed, appears comfortable, AAOx3


HEENT: NC, AT


CVS:  +S1S2


Lungs: Fair air entry b/l, no appreciable wheezing / rhonchi / rales 


Abdomen: Soft, Non-distended and non-tender


Extremities: No evidence of LE edema, - Calf tenderness





Assessment and plan:


Abdominal aortic aneurysm; possibly with dissection based on imaging (unlikely)


- Patient remains hemodynamically stable with blood pressures well controlled, 

not in any pain 


- CT abdomen / pelvis without contrast 5/10: Difficult to evaluate but suspected

descending thoracic aortic and abdominal aortic dissecting aneurysm. 

Contrast-enhanced examination is recommended. The finding might be critical.


- Will continue to keep BP and HR optimized 


- Dr. Noriega on consultation; appreciate their input


- Does not require emergent intervention at this time; will need to get contrast

enhanced CT (chest / abdomen / pelvis) once renal function improves





Acute kidney injury - possibly 2/2 pre-renal 2/2 poor oral intake / diarrhea, 

possibly 2/2 intra-renal etiology 2/2 chronic renal disease


- Cr baseline unclear; last Cr compared against was 1.0 in 2010


- Cr on 5/6 on 1.6; has deteriorated since that point to 2.6 currently 


- FENa: 0.5%


- Renal US pending 


- Renal Doppler flow 5/10: No imaging findings to suggest renovascular hypertens

ion.


- Will continue to avoid nephrotoxic medications


- s/p 1.5 Liters of NS in ER


- Will increase rate of IV fluid hydration and provide fluid bolus


- Repeat BMP today at 3PM





Bilateral LE weakness - possibly 2/2 PVD


- Presented on 5/6/2020 with back pain and progressive LE weakness / pain


- Physical does reveal pulses at bilateral lower extremities


- Will go for duplex arterial US of bilateral LE today 





Nicotine dependence


- Patient has been strongly advised for nicotine / smoking cessation


- c/w Nicotine patch  





Obesity 


- BMI 30.9


- Complicating medical care





DVT prophylaxis 


- c/w TEDs/Sequentials





Disposition:


- Will start activity as tolerated


- Will start PT





VS,Fishbone, I+O


VS, Fishbone, I+O


Laboratory Tests


5/10/20 13:24








5/10/20 18:18








5/11/20 05:23











Vital Signs








  Date Time  Temp Pulse Resp B/P (MAP) Pulse Ox O2 Delivery O2 Flow Rate FiO2


 


5/11/20 08:00 97.6 63 17 122/72 (89) 95 Room Air  














I&O- Last 24 Hours up to 6 AM 


 


 5/11/20





 06:00


 


Intake Total 3420 ml


 


Output Total 600 ml


 


Balance 2820 ml

















SHARI SPEAR MD                May 11, 2020 09:25

## 2020-05-12 VITALS — SYSTOLIC BLOOD PRESSURE: 137 MMHG | DIASTOLIC BLOOD PRESSURE: 80 MMHG

## 2020-05-12 VITALS — SYSTOLIC BLOOD PRESSURE: 141 MMHG | DIASTOLIC BLOOD PRESSURE: 84 MMHG

## 2020-05-12 VITALS — DIASTOLIC BLOOD PRESSURE: 79 MMHG | SYSTOLIC BLOOD PRESSURE: 132 MMHG

## 2020-05-12 VITALS — SYSTOLIC BLOOD PRESSURE: 130 MMHG | DIASTOLIC BLOOD PRESSURE: 78 MMHG

## 2020-05-12 LAB
BASOPHILS # BLD AUTO: 0.1 10^3/UL (ref 0–0.2)
BASOPHILS NFR BLD AUTO: 0.7 % (ref 0–1)
BUN SERPL-MCNC: 31 MG/DL (ref 7–18)
CALCIUM SERPL-MCNC: 8.1 MG/DL (ref 8.8–10.2)
CHLORIDE SERPL-SCNC: 116 MEQ/L (ref 98–107)
CO2 SERPL-SCNC: 24 MEQ/L (ref 21–32)
CREAT SERPL-MCNC: 2.09 MG/DL (ref 0.7–1.3)
EOSINOPHIL # BLD AUTO: 0.4 10^3/UL (ref 0–0.5)
EOSINOPHIL NFR BLD AUTO: 5.4 % (ref 0–3)
GFR SERPL CREATININE-BSD FRML MDRD: 33.4 ML/MIN/{1.73_M2} (ref 42–?)
GLUCOSE SERPL-MCNC: 94 MG/DL (ref 70–100)
HCT VFR BLD AUTO: 40.4 % (ref 42–52)
HGB BLD-MCNC: 13.3 G/DL (ref 13.5–17.5)
LYMPHOCYTES # BLD AUTO: 2.1 10^3/UL (ref 1.5–5)
LYMPHOCYTES NFR BLD AUTO: 28.9 % (ref 24–44)
MAGNESIUM SERPL-MCNC: 2 MG/DL (ref 1.8–2.4)
MCH RBC QN AUTO: 33.2 PG (ref 27–33)
MCHC RBC AUTO-ENTMCNC: 32.9 G/DL (ref 32–36.5)
MCV RBC AUTO: 100.7 FL (ref 80–96)
MONOCYTES # BLD AUTO: 0.7 10^3/UL (ref 0–0.8)
MONOCYTES NFR BLD AUTO: 9 % (ref 0–5)
NEUTROPHILS # BLD AUTO: 4 10^3/UL (ref 1.5–8.5)
NEUTROPHILS NFR BLD AUTO: 55.4 % (ref 36–66)
PLATELET # BLD AUTO: 132 10^3/UL (ref 150–450)
POTASSIUM SERPL-SCNC: 4.5 MEQ/L (ref 3.5–5.1)
RBC # BLD AUTO: 4.01 10^6/UL (ref 4.3–6.1)
SODIUM SERPL-SCNC: 145 MEQ/L (ref 136–145)
WBC # BLD AUTO: 7.2 10^3/UL (ref 4–10)

## 2020-05-12 RX ADMIN — ATORVASTATIN CALCIUM SCH MG: 20 TABLET, FILM COATED ORAL at 13:02

## 2020-05-12 RX ADMIN — SODIUM CHLORIDE SCH MLS/HR: 9 INJECTION, SOLUTION INTRAVENOUS at 04:16

## 2020-05-12 RX ADMIN — SODIUM CHLORIDE SCH MLS/HR: 9 INJECTION, SOLUTION INTRAVENOUS at 11:24

## 2020-05-12 RX ADMIN — ATORVASTATIN CALCIUM SCH MG: 20 TABLET, FILM COATED ORAL at 13:00

## 2020-05-12 NOTE — IPNPDOC
Text Note


Date of Service


The patient was seen on 5/12/20.





NOTE


Subjective: No any acute events overnight. Patient denies fever, chills nausea, 

vomiting, chest pain, palpitations, diarrhea or dysuria











Objective:


PHYSICAL EXAMINATION ON DISCHARGE:


VITAL SIGNS: Please see below.


GENERAL: awake, alert, NAD


HEENT: NCAT, anicteric sclera, AUREA


NECK: supple, no JVD


CARDIOVASCULAR EXAMINATION: NS1S2, regular rate/rhythm


RESPIRATORY EXAMINATION: CTA b/l, no wheezes/rales/rhonchi


ABDOMINAL EXAMINATION: positive bowel sounds x 4, NT


EXTREMITIES: no cyanosis, clubbing, edema


SKIN: warm, no rashes.


NEUROLOGICAL EXAMINATION: AAO x 3, no motor/sensory deficits


PSYCHIATRIC EXAMINATION: calm, normal affect











Assessment and plan


Patient is 70 years old male with past medical history of active smoking, 

peripheral vascular diseases who was admitted with ENRIKE most likely secondary to 

dehydration due to diarrhea. Also patient was found to have incidentally on a 

CAT scan  possibility of dissecting aortic aneurysm. Case was discussed with Dr. Noriega and images were reviewed; unlikely that this is acute, however no 

emergent intervention at this time.





Abdominal aortic aneurysm


Unlikely its acute given absence of typical clinical presentation


I talked to Dr. Noriega about possible surgical intervention. Dr. Noriega recommended follow-up with PCP and possible referral to [facility] for 

triple AAA repair. Our plan to continue hydration in order to improve kidney 

function. Patient will be discharged with close follow-up with primary care 

physician, he will be given a referral to [facility] for consult and imaging study.








Atherosclerosis


Patient has 22.8% current 10 year ASCVD risk


Patient advised to quit smoking


Aspirin 81 mg


Atorvastatin 40 mg








Acute kidney injury


Multifactorial. Most likely due to dehydration secondary to diarrhea. Doppler 

ultrasound showed No imaging findings to suggest renovascular hypertension.


Improved today


Continue hydration








Bilateral LE weakness 


Resolved. Most likely secondary to electrolyte imbalance superimposed with PVD


Doppler ultrasound showed  Ankle brachial indices are normal measured at 1.35 on

the right and 1.15 on the left.  Mild plaquing is seen.  No significant stenosis

is seen.  In the right lower extremity, the distal anterior tibial artery is not

seen.  A large vessel laterally feet.  Foot likely from peroneal branch.  Study 

is otherwise unremarkable.





Nicotine dependence


I will DC nicotine patch due to vascular constriction as a side effect


Pharmacological treatment was offered, but patient declined





Obesity 


Complicating medical care





VS,Fishabbeye, I+O


VS, Fishbone, I+O


Laboratory Tests


5/11/20 14:41








5/12/20 05:16











Vital Signs








  Date Time  Temp Pulse Resp B/P (MAP) Pulse Ox O2 Delivery O2 Flow Rate FiO2


 


5/12/20 08:00 97.9 62 18 137/80 (99) 98 Room Air  














I&O- Last 24 Hours up to 6 AM 


 


 5/12/20





 06:00


 


Intake Total 2490 ml


 


Output Total 1025 ml


 


Balance 1465 ml

















TITA HARRELL DO            May 12, 2020 10:59

## 2020-05-12 NOTE — DS.PDOC
Discharge Summary


General


Date of Admission


May 10, 2020 at 15:53


Date of Discharge


5/12/20





Discharge Summary


PROCEDURES PERFORMED DURING STAY: [None].





ADMITTING DIAGNOSES: 


Bilateral LE weakness 


Acute kidney injury


Abdominal aortic aneurysm


Atherosclerosis


Diarrhea


Nicotine dependence


Obesity





DISCHARGE DIAGNOSES:


Bilateral LE weakness 


Acute kidney injury


Abdominal aortic aneurysm


Atherosclerosis


Diarrhea


Nicotine dependence


Obesity





COMPLICATIONS/CHIEF COMPLAINT: Acute Renal Failure.





HISTORY OF PRESENT ILLNESS: Patient is 70 years old male with past medical 

history of active smoking, peripheral vascular diseases who was admitted with 

ENRIKE most likely secondary to dehydration due to diarrhea. Also patient was found

 to have incidentally on a CAT scan  possibility of dissecting aortic aneurysm. 

Case was discussed with Dr. Noriega and images were reviewed; unlikely that 

this is acute, however no emergent intervention at this time.





Abdominal aortic aneurysm


Unlikely its acute given absence of typical clinical presentation


I talked to Dr. Noriega about possible surgical intervention. Dr. Noriega recommended follow-up with PCP and possible referral to [facility] for 

triple AAA repair. Patient will be discharged with close follow-up with primary 

care physician, he will be given a referral to [facility] for consult and imaging 

study. Recommended to check BMP in 2-3 days








Atherosclerosis


Patient has 22.8% current 10 year ASCVD risk


Patient advised to quit smoking


Aspirin 81 mg


Atorvastatin 40 mg








Acute kidney injury


Multifactorial. Most likely due to dehydration secondary to diarrhea. Doppler 

ultrasound showed No imaging findings to suggest renovascular hypertension.


Improved today


Continue hydration in the outpatient settings








Bilateral LE weakness 


Resolved. Most likely secondary to electrolyte imbalance superimposed with PVD


Doppler ultrasound showed  Ankle brachial indices are normal measured at 1.35 on

 the right and 1.15 on the left.  Mild plaquing is seen.  No significant 

stenosis is seen.  In the right lower extremity, the distal anterior tibial 

artery is not seen.  A large vessel laterally feet.  Foot likely from peroneal 

branch.  Study is otherwise unremarkable.





Nicotine dependence


I will DC nicotine patch due to vascular constriction as a side effect


Pharmacological treatment was offered, but patient declined





Obesity 


Complicating medical care











HOSPITAL COURSE: Following issue addressed





DISCHARGE MEDICATIONS: Please see below.


 


ALLERGIES: Please see below.





PHYSICAL EXAMINATION ON DISCHARGE:


VITAL SIGNS: Please see below.


GENERAL: awake, alert, NAD


HEENT: NCAT, anicteric sclera, AUREA


NECK: supple, no JVD


CARDIOVASCULAR EXAMINATION: NS1S2, regular rate/rhythm


RESPIRATORY EXAMINATION: CTA b/l, no wheezes/rales/rhonchi


ABDOMINAL EXAMINATION: positive bowel sounds x 4, NT


EXTREMITIES: no cyanosis, clubbing, edema


SKIN: warm, no rashes.


NEUROLOGICAL EXAMINATION: AAO x 3, no motor/sensory deficits


PSYCHIATRIC EXAMINATION: calm, normal affect














LABORATORY DATA: Please see below.





IMAGING: 


REASON:  Acute renal failure.


 


There are no priors for comparison.


 


Prior contrast-enhanced chest CT, 07/10/2010, was reviewed.


 


The descending thoracic aorta seen on this abdominal CT is massively ectatic, 

now


measuring 6.5 cm, previously under 3 cm on the imaged portion seen on the prior


exam. In addition, the aneurysm continues to just proximal to the aortoiliac


bifurcation. At the bifurcation, the maximal AP dimension is 3.4 cm. Without


intravenous contrast, I cannot assess properly for an acute aortic dissection. I


suspect there is an acute aortic dissection, but contrast would be necessary for


further evaluation.


 


Limited evaluation of the liver, spleen, pancreas, adrenal glands, and kidneys


show no gross abnormalities. Limited evaluation of the bowel loops and their


mesenteries show no gross abnormalities. No free fluid or free air is seen in 

the


abdomen or pelvis. There is sigmoid colon diverticulosis. There is no evidence 

of


an intrapelvic or intra-abdominal mass or adenopathy.


 


Bone window technique throughout the examination shows the osseous structures to


be within normal limits.


 


The lung bases are clear.


 


IMPRESSION:


 


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


aortic dissecting aneurysm. Contrast-enhanced examination is recommended. The


finding might be critical.


 


These findings were discussed with Manuel Sin, the patient's healthcare


provider at the time of this dictation.


 


A follow up phone call was placed to Mr. Sin who relayed that Dr. Noriega will evaluate the patient in the ER at this time and further


determination on whether they wish to order a contrast enhanced examination will


be made.


 


 


Electronically Signed by


Lance Rao DO 05/10/2020 03:39 P





PROGNOSIS: Fair





ACTIVITY: [As tolerated].





DIET: Cardiac





DISCHARGE PLAN: Home











DISCHARGE INSTRUCTIONS:


Patient will need to follow with PCP in 2-3 days, repeat BMP in 2-3 days, 

patient will need to get referral from PCP to [facility] for imaging study and 

possible AAA repair





ITEMS TO FOLLOWUP ON ON OUTPATIENT:


With PCP





DISCHARGE CONDITION: [Stable].





TIME SPENT ON DISCHARGE: Greater than 20 minutes.





Vital Signs/I&Os





Vital Signs








  Date Time  Temp Pulse Resp B/P (MAP) Pulse Ox O2 Delivery O2 Flow Rate FiO2


 


5/12/20 12:00 97.2 59 18 141/84 (103) 100 Room Air  














I&O- Last 24 Hours up to 6 AM 


 


 5/12/20





 06:00


 


Intake Total 2490 ml


 


Output Total 1025 ml


 


Balance 1465 ml











Laboratory Data


Labs 24H


Laboratory Tests 2


5/12/20 05:16: 


Immature Granulocyte % (Auto) 0.6, Neutrophils (%) (Auto) 55.4, Lymphocytes (%) 

(Auto) 28.9, Monocytes (%) (Auto) 9.0H, Eosinophils (%) (Auto) 5.4H, Basophils 

(%) (Auto) 0.7, Neutrophils # (Auto) 4.0, Lymphocytes # (Auto) 2.1, Monocytes # 

(Auto) 0.7, Eosinophils # (Auto) 0.4, Basophils # (Auto) 0.1, Nucleated Red 

Blood Cells % (auto) 0.0, Anion Gap 5L, Glomerular Filtration Rate 33.4L, Calci

um Level 8.1L, Magnesium Level 2.0


CBC/BMP


Laboratory Tests


5/12/20 05:16











Discharge Medications


Scheduled


Aspirin (Children's Aspirin) 81 Mg Tab.chew, 81 MG PO DAILY


Atorvastatin Calcium (Atorvastatin Calcium) 20 Mg Tablet, 40 MG PO DAILY





Scheduled PRN


Acetaminophen (Acetaminophen) 325 Mg Tablet, 650 MG PO Q4H PRN for PAIN OR FEVER





Allergies


Coded Allergies:  


     No Known Allergies (Verified , 7/6/10)











TITA HARRELL DO            May 12, 2020 15:54

## 2020-05-15 ENCOUNTER — HOSPITAL ENCOUNTER (OUTPATIENT)
Dept: HOSPITAL 53 - M SFHCADAM | Age: 70
End: 2020-05-15
Attending: PHYSICIAN ASSISTANT
Payer: MEDICARE

## 2020-05-15 DIAGNOSIS — N18.3: Primary | ICD-10-CM

## 2020-05-15 LAB
ALBUMIN SERPL BCG-MCNC: 3.3 GM/DL (ref 3.2–5.2)
BUN SERPL-MCNC: 23 MG/DL (ref 7–18)
CALCIUM SERPL-MCNC: 9.1 MG/DL (ref 8.8–10.2)
CHLORIDE SERPL-SCNC: 107 MEQ/L (ref 98–107)
CO2 SERPL-SCNC: 29 MEQ/L (ref 21–32)
CREAT SERPL-MCNC: 1.95 MG/DL (ref 0.7–1.3)
GFR SERPL CREATININE-BSD FRML MDRD: 36.2 ML/MIN/{1.73_M2} (ref 42–?)
GLUCOSE SERPL-MCNC: 87 MG/DL (ref 70–100)
PHOSPHATE SERPL-MCNC: 3.7 MG/DL (ref 2.5–4.9)
POTASSIUM SERPL-SCNC: 5.2 MEQ/L (ref 3.5–5.1)
SODIUM SERPL-SCNC: 141 MEQ/L (ref 136–145)

## 2020-06-18 ENCOUNTER — HOSPITAL ENCOUNTER (OUTPATIENT)
Dept: HOSPITAL 53 - M RAD | Age: 70
End: 2020-06-18
Attending: PHYSICIAN ASSISTANT
Payer: MEDICARE

## 2020-06-18 DIAGNOSIS — R60.1: ICD-10-CM

## 2020-06-18 DIAGNOSIS — I71.4: Primary | ICD-10-CM

## 2020-06-18 PROCEDURE — 93306 TTE W/DOPPLER COMPLETE: CPT

## 2020-06-18 PROCEDURE — 76775 US EXAM ABDO BACK WALL LIM: CPT

## 2020-06-18 PROCEDURE — 76706 US ABDL AORTA SCREEN AAA: CPT

## 2020-06-18 NOTE — REP
Abdominal aortic sonography:

 

History:  Abdominal aortic aneurysm.  Comparison CT study May 10, 2020.

 

Findings:  As seen on recent CT study, there is diffuse aneurysmal dilation of

the abdominal aorta.  At the diaphragmatic hiatus, aortic dimensions are 6.5 cm

AP by 6.7 cm transverse.  The aorta is obscured at the level of the main renal

artery origins.  The mid aortic dimensions are 5.2 x 5.7 cm.  Distal aorta

measures 4.6 x 3.9 cm.  The right and left common iliac arteries are ectatic but

not frankly aneurysmal measuring 1.6 and 1.9 cm in greatest AP dimension

respectively.

 

Impression:

 

Diffuse aneurysmal dilation of the entire abdominal aorta.  This is a change from

comparison CT study of the chest dated July 6, 2010.  It is unchanged from May

10, 2020 CT study.  Consider CT angiography of the chest abdomen pelvis with IV

contrast if feasible.

 

 

Electronically Signed by

Rich Lee MD 06/18/2020 08:45 A

## 2020-06-18 NOTE — REP
URINARY TRACT SONOGRAPHY:

 

HISTORY:  Chronic kidney disease.

 

Comparison CT study May 10, 2020.

 

FINDINGS:  Scan quality is inhibited some degree by abdominal gas and patient

body habitus.  Renal cortical echogenicity pattern is normal and contours are

smooth bilaterally.  The right kidney measures 11.4 x 5.7 x 4.5 cm.  Left renal

dimensions are 12.0 x 5.0 x 5.0 cm.  No hydronephrosis, cyst, or mass is seen.

 

Scanning at the level of the bladder shows no abnormality.

 

IMPRESSION:

 

No morphologic abnormality seen.

 

 

Electronically Signed by

Rich Lee MD 06/18/2020 09:30 A

## 2020-06-19 ENCOUNTER — HOSPITAL ENCOUNTER (OUTPATIENT)
Dept: HOSPITAL 53 - M RAD | Age: 70
End: 2020-06-19
Attending: PHYSICIAN ASSISTANT
Payer: MEDICARE

## 2020-06-19 DIAGNOSIS — M48.061: ICD-10-CM

## 2020-06-19 DIAGNOSIS — N39.45: ICD-10-CM

## 2020-06-19 DIAGNOSIS — I71.4: ICD-10-CM

## 2020-06-19 DIAGNOSIS — M51.26: Primary | ICD-10-CM

## 2020-06-19 DIAGNOSIS — R20.0: ICD-10-CM

## 2020-06-19 NOTE — ECHO
DATE OF PROCEDURE:  06/18/2020

 

YOB: 1951

AGE:  70

 

REFERRING PROVIDER:  KATYA Santiago

 

REASON FOR THE STUDY:  Descending aortic aneurysm.

 

2D MEASUREMENTS:

IVS:  1.3 cm

LV:  3.8 cm

LVPW:  1.2 cm

LA:  3.5 cm

Aorta:  3.5 cm

IVC:  1.1 cm

 

DOPPLER MEASUREMENTS:

Peak velocity across the aortic valve:  1.2 meters per second

Peak velocity across the LVOT:  1.1 meters per second

Mitral E:  0.55,  Mitral A:  0.78 with a ratio of 0.7.

Maximum tricuspid valve velocity:  2.2 meters per second

 

2D COMMENTS:

1.  Normal left ventricular size with mildly increased left ventricular wall

thickness and normal global left ventricular systolic function.  The estimated

left ventricular systolic ejection fraction is 60-65%.

2  Normal aortic root.  The descending aorta appeared to be enlarged and 
measured

5.7 cm.

3.  Normal left atrium.  Normal right atrium and right ventricle.

4.  The atrial septum appeared to be normal without evidence of defect or shunt.


5.  No pericardial effusion seen.

6.  Mildly calcified aortic valve with normal leaflet excursion.  Mildly

calcified mitral annulus with normal anterior mitral valve leaflet motion.

Normal tricuspid valve.  The pulmonic valve and proximal pulmonary artery

branches were not well visualized.

7.  The inferior vena cava was normal in size, central venous pressure is most

likely normal.

 

DOPPLER:  

It detects trace mitral regurgitation, mild aortic regurgitation, and

mild tricuspid regurgitation.  The calculated pulmonary artery systolic pressure

was normal.

 

IMPRESSION:

1.  Normal global left ventricular systolic function with mild concentric left

ventricular hypertrophy.  There are features of left ventricular diastolic

dysfunction manifested by abnormal relaxation.

2.  Aortic valve sclerosis with mild aortic regurgitation but no aortic 
stenosis. 

3.  A dilated descending aorta was noted in limited views.  I recommend a chest

CT for further evaluation.

4.  Mitral annulus calcification with trace mitral regurgitation.

5.  Trace tricuspid regurgitation with a normal calculated pulmonary artery

systolic pressure.

MTDD

## 2020-06-20 NOTE — REP
MRI LUMBAR SPINE WITHOUT CONTRAST:

 

HISTORY:  Lumbar degenerative disc disease.  Bilateral leg paresthesias.

Question urinary incontinence.  No comparison MRI study.  Comparison lumbar spine

radiographs, May 6, 2020.  Preliminary report provided at the time of exam by Dr. Paul.

 

TECHNIQUE:  Sagittal and axial T1- and T2-weighted scans are acquired in the

usual fashion with and without fat saturation.  Sequences include spin echo,

turbo spin-echo, and STIR imaging sequences.

 

MRI FINDINGS:  There is straightening of the normal lumbar lordosis.  Lumbar

vertebral body heights are preserved.  No bony destructive lesion is seen.  The

tip of the conus medullaris is normal in position and appearance at L1.  There is

a 6.3 cm abdominal aortic aneurysm noted incidentally.  This is identified on

recent CT study and is unchanged.

 

Axial and sagittal images show no disc abnormality at L2-3 or L1-2.

 

At L3-4, there is diffuse disc bulging.  The disc is narrowed in height.  There

is mild central canal stenosis at the L3-4 level due to diffuse disc bulging and

developmentally short pedicles along with mild ligamentum flavum and facet

hypertrophy.  The midline AP dimension of the thecal sac at L3-4 is 11 mm.  There

is some foraminal disc bulging.  Minimal right-sided neural foraminal narrowing

is seen due to associated spurring.

 

At L4-L5, there is moderate central canal stenosis due to diffuse disc bulging,

developmentally short pedicles, and facet and ligamentum flavum hypertrophy.  The

midline AP dimension of the thecal sac at L4-5 is 6 mm.  There is mild bilateral

neural foraminal narrowing due to facet hypertrophy and disc bulging.

 

At L5-S1, there is a small central focal disc protrusion indenting the ventral

margin of the thecal sac.  No central canal stenosis is seen.  No foraminal

stenosis is noted.

 

There is no evidence of spondylolysis or spondylolisthesis.

 

IMPRESSION:

 

1.  Large abdominal aortic aneurysm again noted.

 

2.  Moderate central canal stenosis noted at L4-5 with bilateral L4-5 foraminal

narrowing.

 

3.  Minimal right-sided foraminal narrowing at L3-4.  Mild central canal stenosis

at L3-4.

 

 

Electronically Signed by

Rich Lee MD 06/20/2020 08:20 A

## 2020-06-23 ENCOUNTER — HOSPITAL ENCOUNTER (OUTPATIENT)
Dept: HOSPITAL 53 - M SFHCADAM | Age: 70
End: 2020-06-23
Attending: PHYSICIAN ASSISTANT
Payer: MEDICARE

## 2020-06-23 DIAGNOSIS — N18.3: Primary | ICD-10-CM

## 2020-06-23 LAB
ALBUMIN SERPL BCG-MCNC: 3.8 GM/DL (ref 3.2–5.2)
BUN SERPL-MCNC: 23 MG/DL (ref 7–18)
CALCIUM SERPL-MCNC: 9.5 MG/DL (ref 8.8–10.2)
CHLORIDE SERPL-SCNC: 109 MEQ/L (ref 98–107)
CO2 SERPL-SCNC: 27 MEQ/L (ref 21–32)
CREAT SERPL-MCNC: 1.38 MG/DL (ref 0.7–1.3)
GFR SERPL CREATININE-BSD FRML MDRD: 53.9 ML/MIN/{1.73_M2} (ref 42–?)
GLUCOSE SERPL-MCNC: 87 MG/DL (ref 70–100)
PHOSPHATE SERPL-MCNC: 3.6 MG/DL (ref 2.5–4.9)
POTASSIUM SERPL-SCNC: 5.1 MEQ/L (ref 3.5–5.1)
SODIUM SERPL-SCNC: 141 MEQ/L (ref 136–145)

## 2021-04-20 ENCOUNTER — HOSPITAL ENCOUNTER (OUTPATIENT)
Dept: HOSPITAL 53 - M LABDRWAD | Age: 71
End: 2021-04-20
Attending: SURGERY
Payer: MEDICARE

## 2021-04-20 DIAGNOSIS — I71.6: Primary | ICD-10-CM

## 2021-04-20 LAB
BUN SERPL-MCNC: 28 MG/DL (ref 7–18)
CREAT SERPL-MCNC: 1.13 MG/DL (ref 0.7–1.3)
GFR SERPL CREATININE-BSD FRML MDRD: > 60 ML/MIN/{1.73_M2} (ref 42–?)

## 2021-04-23 ENCOUNTER — HOSPITAL ENCOUNTER (OUTPATIENT)
Dept: HOSPITAL 53 - M RAD | Age: 71
End: 2021-04-23
Attending: PHYSICIAN ASSISTANT
Payer: MEDICARE

## 2021-04-23 DIAGNOSIS — I71.6: ICD-10-CM

## 2021-04-23 DIAGNOSIS — Z48.812: Primary | ICD-10-CM

## 2021-04-23 DIAGNOSIS — R91.1: ICD-10-CM

## 2021-04-23 PROCEDURE — 74174 CTA ABD&PLVS W/CONTRAST: CPT

## 2021-04-23 PROCEDURE — 71275 CT ANGIOGRAPHY CHEST: CPT

## 2021-04-23 NOTE — REP
INDICATION:

THORACOABDOMINAL AORTIC ANEURYSM, WITHOUT RUPTURE.



COMPARISON:

Comparison chest CT study 10 July 2010.  More recent comparison study is reviewed from

[facility] June 25, 2020..



TECHNIQUE:

Contrast dose:  100 ML of Isovue 370 are administered intravenously.

CT technique:  Helical scanning is acquired and overlapping 1.5 mm and contiguous 3 mm

axial images are reformatted.  In addition, maximum intensity projection and

multiplanar re-formation images are generated in sagittal and coronal imaging

projections.



FINDINGS:

There is good opacification of the aorta and great vessels.  The patient is status

post stent graft placement in the aortic arch and descending thoracic aorta for aortic

aneurysm.  The descending thoracic aorta is aneurysmally dilated.  There is no

evidence of endoleak.  The descending aorta measures 5.6 cm in greatest oblique

anteroposterior dimension at the inferior aspect of the left atrium.  Previously, this

measurement was 5.7 cm.  No further enlargement.  No evidence of dissection.  The

ascending aorta measures 4.3 cm at the level of the right main pulmonary artery.



The pulmonary arterial tree opacifies homogeneously as well.  There is no evidence to

suggest pulmonary embolus.  No filling defect or vessel cutoff is seen.



There are 2 noncalcified 4 mm nodules in the right lower lobe displayed on page 84 and

85 of 129 in series 405 of today's study.  These are unchanged from June 25, 2020 and

July 10, 2010 prior CT studies.  There is mild linear fibrosis in the left base.  Lung

fields are otherwise clear.



Normal adrenal glands are observed.  The visualized upper abdominal structures are

unremarkable.  There are surgical clips in the gallbladder fossa.



IMPRESSION:

Status post stent graft placement for descending thoracic aortic aneurysm.  No

evidence of endovascular leak or progressive dilation.  Stable right lower lobe

pulmonary nodules..





<Electronically signed by Jayme Lee > 04/23/21 [id number]

## 2021-04-23 NOTE — REP
INDICATION:

THORACOABDOMINAL AORTIC ANEURYSM, WITHOUT RUPTURE.



COMPARISON:

Comparison CT angiography June 25, 2020..



TECHNIQUE:

Helical scanning is acquired following the intravenous injection of 100 mL of Isovue

370.  3 mm axial images re-formatted.  Coronal and sagittal MPR images are generated.

3D surface rendered images are generated and viewed rotationally.



FINDINGS:

A descending aortic stent graft is noted in place extending through the abdominal

aorta to bilateral common iliac arteries.  There are vascular stents in place in the

renal arteries bilaterally and in the superior mesenteric artery.  These do not appear

stenotic.  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 there is no definite

endoleak.  The abdominal aortic aneurysm measures 5.2 cm in anteroposterior dimension

at the lower pole of the kidneys, previously 4.9 cm.  No periaortic hematoma or

fibrosis is seen.  The kidneys show cortical atrophy, right more so than left.  No

hydronephrosis or mass is seen.



There is sigmoid colon diverticulosis.  No bony destructive lesion is seen.  No focal

hepatic or splenic lesion is seen.



IMPRESSION:

Status post aorto bi-iliac stent graft and therapy for thoracoabdominal aneurysm.  SMA

and bilateral renal artery stents are noted in place which demonstrate patency.  the

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





<Electronically signed by Jayme Lee > 04/23/21 2028

## 2021-08-30 ENCOUNTER — HOSPITAL ENCOUNTER (OUTPATIENT)
Dept: HOSPITAL 53 - M SFHCADAM | Age: 71
End: 2021-08-30
Attending: PHYSICIAN ASSISTANT
Payer: MEDICARE

## 2021-08-30 DIAGNOSIS — E66.01: ICD-10-CM

## 2021-08-30 DIAGNOSIS — N18.31: ICD-10-CM

## 2021-08-30 DIAGNOSIS — M51.36: ICD-10-CM

## 2021-08-30 DIAGNOSIS — F17.210: ICD-10-CM

## 2021-08-30 DIAGNOSIS — I71.4: ICD-10-CM

## 2021-08-30 DIAGNOSIS — E78.2: Primary | ICD-10-CM

## 2021-08-30 LAB
ALBUMIN SERPL BCG-MCNC: 3.1 GM/DL (ref 3.2–5.2)
ALT SERPL W P-5'-P-CCNC: 29 U/L (ref 12–78)
BASOPHILS # BLD AUTO: 0.1 10^3/UL (ref 0–0.2)
BASOPHILS NFR BLD AUTO: 0.6 % (ref 0–1)
BILIRUB SERPL-MCNC: 0.7 MG/DL (ref 0.2–1)
BUN SERPL-MCNC: 24 MG/DL (ref 7–18)
CALCIUM SERPL-MCNC: 9.1 MG/DL (ref 8.8–10.2)
CHLORIDE SERPL-SCNC: 104 MEQ/L (ref 98–107)
CHOLEST SERPL-MCNC: 116 MG/DL (ref ?–200)
CHOLEST/HDLC SERPL: 2.11 {RATIO} (ref ?–5)
CO2 SERPL-SCNC: 28 MEQ/L (ref 21–32)
CREAT SERPL-MCNC: 1.09 MG/DL (ref 0.7–1.3)
EOSINOPHIL # BLD AUTO: 0.4 10^3/UL (ref 0–0.5)
EOSINOPHIL NFR BLD AUTO: 4.6 % (ref 0–3)
GFR SERPL CREATININE-BSD FRML MDRD: > 60 ML/MIN/{1.73_M2} (ref 42–?)
GLUCOSE SERPL-MCNC: 72 MG/DL (ref 70–100)
HCT VFR BLD AUTO: 47 % (ref 42–52)
HDLC SERPL-MCNC: 55 MG/DL (ref 40–?)
HGB BLD-MCNC: 15.5 G/DL (ref 13.5–17.5)
LDLC SERPL CALC-MCNC: 34 MG/DL (ref ?–100)
LYMPHOCYTES # BLD AUTO: 2.9 10^3/UL (ref 1.5–5)
LYMPHOCYTES NFR BLD AUTO: 33.5 % (ref 24–44)
MCH RBC QN AUTO: 32.3 PG (ref 27–33)
MCHC RBC AUTO-ENTMCNC: 33 G/DL (ref 32–36.5)
MCV RBC AUTO: 97.9 FL (ref 80–96)
MONOCYTES # BLD AUTO: 0.7 10^3/UL (ref 0–0.8)
MONOCYTES NFR BLD AUTO: 8 % (ref 2–8)
NEUTROPHILS # BLD AUTO: 4.6 10^3/UL (ref 1.5–8.5)
NEUTROPHILS NFR BLD AUTO: 52.5 % (ref 36–66)
NONHDLC SERPL-MCNC: 61 MG/DL
PLATELET # BLD AUTO: 104 10^3/UL (ref 150–450)
POTASSIUM SERPL-SCNC: 4.6 MEQ/L (ref 3.5–5.1)
PROT SERPL-MCNC: 7 GM/DL (ref 6.4–8.2)
RBC # BLD AUTO: 4.8 10^6/UL (ref 4.3–6.1)
SODIUM SERPL-SCNC: 137 MEQ/L (ref 136–145)
TRIGL SERPL-MCNC: 136 MG/DL (ref ?–150)
TSH SERPL DL<=0.005 MIU/L-ACNC: 0.83 UIU/ML (ref 0.36–3.74)
WBC # BLD AUTO: 8.8 10^3/UL (ref 4–10)

## 2022-08-30 ENCOUNTER — HOSPITAL ENCOUNTER (OUTPATIENT)
Dept: HOSPITAL 53 - M ADAMS | Age: 72
End: 2022-08-30
Attending: PHYSICIAN ASSISTANT
Payer: MEDICARE

## 2022-08-30 DIAGNOSIS — E66.01: ICD-10-CM

## 2022-08-30 DIAGNOSIS — N18.31: Primary | ICD-10-CM

## 2022-08-30 DIAGNOSIS — E78.2: ICD-10-CM

## 2022-08-30 DIAGNOSIS — F17.210: ICD-10-CM

## 2022-08-30 LAB
ALBUMIN SERPL BCG-MCNC: 3.4 GM/DL (ref 3.2–5.2)
ALT SERPL W P-5'-P-CCNC: 18 U/L (ref 12–78)
BASOPHILS # BLD AUTO: 0.1 10^3/UL (ref 0–0.2)
BASOPHILS NFR BLD AUTO: 0.6 % (ref 0–1)
BILIRUB SERPL-MCNC: 0.6 MG/DL (ref 0.2–1)
BUN SERPL-MCNC: 27 MG/DL (ref 7–18)
CALCIUM SERPL-MCNC: 9.1 MG/DL (ref 8.8–10.2)
CHLORIDE SERPL-SCNC: 106 MEQ/L (ref 98–107)
CHOLEST SERPL-MCNC: 98 MG/DL (ref ?–200)
CHOLEST/HDLC SERPL: 2.33 {RATIO} (ref ?–5)
CO2 SERPL-SCNC: 26 MEQ/L (ref 21–32)
CREAT SERPL-MCNC: 1.33 MG/DL (ref 0.7–1.3)
CREAT UR-MCNC: 136 MG/DL
EOSINOPHIL # BLD AUTO: 0.6 10^3/UL (ref 0–0.5)
EOSINOPHIL NFR BLD AUTO: 7.1 % (ref 0–3)
GFR SERPL CREATININE-BSD FRML MDRD: 56 ML/MIN/{1.73_M2} (ref 42–?)
GLUCOSE SERPL-MCNC: 96 MG/DL (ref 70–100)
HCT VFR BLD AUTO: 47.3 % (ref 42–52)
HDLC SERPL-MCNC: 42 MG/DL (ref 40–?)
HGB BLD-MCNC: 15.4 G/DL (ref 13.5–17.5)
LDLC SERPL CALC-MCNC: 37 MG/DL (ref ?–100)
LYMPHOCYTES # BLD AUTO: 2.5 10^3/UL (ref 1.5–5)
LYMPHOCYTES NFR BLD AUTO: 32.6 % (ref 24–44)
MCH RBC QN AUTO: 31.7 PG (ref 27–33)
MCHC RBC AUTO-ENTMCNC: 32.6 G/DL (ref 32–36.5)
MCV RBC AUTO: 97.3 FL (ref 80–96)
MICROALBUMIN UR-MCNC: 59.1 MG/L
MICROALBUMIN/CREAT UR: 43.4 MCG/MG (ref 0–30)
MONOCYTES # BLD AUTO: 0.5 10^3/UL (ref 0–0.8)
MONOCYTES NFR BLD AUTO: 6.3 % (ref 2–8)
NEUTROPHILS # BLD AUTO: 4.1 10^3/UL (ref 1.5–8.5)
NEUTROPHILS NFR BLD AUTO: 53 % (ref 36–66)
NONHDLC SERPL-MCNC: 56 MG/DL
PLATELET # BLD AUTO: 117 10^3/UL (ref 150–450)
POTASSIUM SERPL-SCNC: 4.3 MEQ/L (ref 3.5–5.1)
PROT SERPL-MCNC: 7.7 GM/DL (ref 6.4–8.2)
RBC # BLD AUTO: 4.86 10^6/UL (ref 4.3–6.1)
SODIUM SERPL-SCNC: 138 MEQ/L (ref 136–145)
TRIGL SERPL-MCNC: 93 MG/DL (ref ?–150)
TSH SERPL DL<=0.005 MIU/L-ACNC: 1.54 UIU/ML (ref 0.36–3.74)
WBC # BLD AUTO: 7.8 10^3/UL (ref 4–10)

## 2022-12-05 ENCOUNTER — HOSPITAL ENCOUNTER (OUTPATIENT)
Dept: HOSPITAL 53 - M LABDRWAD | Age: 72
End: 2022-12-05
Attending: SURGERY
Payer: MEDICARE

## 2022-12-05 DIAGNOSIS — I71.60: Primary | ICD-10-CM

## 2022-12-05 LAB
BUN SERPL-MCNC: 30 MG/DL (ref 9–23)
CREAT SERPL-MCNC: 1.05 MG/DL (ref 0.7–1.3)
GFR SERPL CREATININE-BSD FRML MDRD: > 60 ML/MIN/{1.73_M2} (ref 42–?)

## 2022-12-13 ENCOUNTER — HOSPITAL ENCOUNTER (OUTPATIENT)
Dept: HOSPITAL 53 - M RAD | Age: 72
End: 2022-12-13
Attending: SURGERY
Payer: MEDICARE

## 2022-12-13 DIAGNOSIS — I71.60: Primary | ICD-10-CM

## 2022-12-13 DIAGNOSIS — Z95.818: ICD-10-CM

## 2022-12-13 PROCEDURE — 74174 CTA ABD&PLVS W/CONTRAST: CPT

## 2022-12-13 PROCEDURE — 71275 CT ANGIOGRAPHY CHEST: CPT

## 2023-03-07 ENCOUNTER — HOSPITAL ENCOUNTER (OUTPATIENT)
Dept: HOSPITAL 53 - M SFHCADAM | Age: 73
End: 2023-03-07
Attending: PHYSICIAN ASSISTANT
Payer: COMMERCIAL

## 2023-03-07 DIAGNOSIS — I71.40: ICD-10-CM

## 2023-03-07 DIAGNOSIS — E78.2: Primary | ICD-10-CM

## 2023-03-07 DIAGNOSIS — E66.01: ICD-10-CM

## 2023-03-07 LAB
ALBUMIN SERPL BCG-MCNC: 3.7 G/DL (ref 3.2–5.2)
ALP SERPL-CCNC: 121 U/L (ref 46–116)
ALT SERPL W P-5'-P-CCNC: 19 U/L (ref 7–40)
AST SERPL-CCNC: 17 U/L (ref ?–34)
BASOPHILS # BLD AUTO: 0.1 10^3/UL (ref 0–0.2)
BASOPHILS NFR BLD AUTO: 0.6 % (ref 0–1)
BILIRUB SERPL-MCNC: 0.6 MG/DL (ref 0.3–1.2)
BUN SERPL-MCNC: 32 MG/DL (ref 9–23)
CALCIUM SERPL-MCNC: 9.6 MG/DL (ref 8.3–10.6)
CHLORIDE SERPL-SCNC: 106 MMOL/L (ref 98–107)
CHOLEST SERPL-MCNC: 103 MG/DL (ref ?–200)
CHOLEST/HDLC SERPL: 2.44 {RATIO} (ref ?–5)
CO2 SERPL-SCNC: 28 MMOL/L (ref 20–31)
CREAT SERPL-MCNC: 1.09 MG/DL (ref 0.7–1.3)
CREAT UR-MCNC: 120.7 MG/DL
EOSINOPHIL # BLD AUTO: 0.6 10^3/UL (ref 0–0.5)
EOSINOPHIL NFR BLD AUTO: 6 % (ref 0–3)
GFR SERPL CREATININE-BSD FRML MDRD: > 60 ML/MIN/{1.73_M2} (ref 42–?)
GLUCOSE SERPL-MCNC: 88 MG/DL (ref 74–106)
HCT VFR BLD AUTO: 49.5 % (ref 42–52)
HDLC SERPL-MCNC: 42.1 MG/DL (ref 40–?)
HGB BLD-MCNC: 16 G/DL (ref 13.5–17.5)
LDLC SERPL CALC-MCNC: 44.5 MG/DL (ref ?–100)
LYMPHOCYTES # BLD AUTO: 2.7 10^3/UL (ref 1.5–5)
LYMPHOCYTES NFR BLD AUTO: 29.2 % (ref 24–44)
MCH RBC QN AUTO: 31.8 PG (ref 27–33)
MCHC RBC AUTO-ENTMCNC: 32.3 G/DL (ref 32–36.5)
MCV RBC AUTO: 98.4 FL (ref 80–96)
MICROALBUMIN UR-MCNC: 164 MG/L
MICROALBUMIN/CREAT UR: 135.8 MCG/MG (ref 0–30)
MONOCYTES # BLD AUTO: 0.7 10^3/UL (ref 0–0.8)
MONOCYTES NFR BLD AUTO: 7.1 % (ref 2–8)
NEUTROPHILS # BLD AUTO: 5.3 10^3/UL (ref 1.5–8.5)
NEUTROPHILS NFR BLD AUTO: 56.7 % (ref 36–66)
NONHDLC SERPL-MCNC: 61 MG/DL
PLATELET # BLD AUTO: 108 10^3/UL (ref 150–450)
POTASSIUM SERPL-SCNC: 4.6 MMOL/L (ref 3.5–5.1)
PROT SERPL-MCNC: 7.7 G/DL (ref 5.7–8.2)
RBC # BLD AUTO: 5.03 10^6/UL (ref 4.3–6.1)
SODIUM SERPL-SCNC: 142 MMOL/L (ref 136–145)
TRIGL SERPL-MCNC: 82 MG/DL (ref ?–150)
TSH SERPL DL<=0.005 MIU/L-ACNC: 1.28 UIU/ML (ref 0.55–4.78)
WBC # BLD AUTO: 9.3 10^3/UL (ref 4–10)

## 2023-09-08 ENCOUNTER — HOSPITAL ENCOUNTER (OUTPATIENT)
Dept: HOSPITAL 53 - M PLAIMG | Age: 73
End: 2023-09-08
Attending: PHYSICIAN ASSISTANT
Payer: MEDICARE

## 2023-09-08 DIAGNOSIS — R91.8: Primary | ICD-10-CM

## 2023-09-12 ENCOUNTER — HOSPITAL ENCOUNTER (OUTPATIENT)
Dept: HOSPITAL 53 - M SFHCADAM | Age: 73
End: 2023-09-12
Attending: PHYSICIAN ASSISTANT
Payer: COMMERCIAL

## 2023-09-12 DIAGNOSIS — N18.31: ICD-10-CM

## 2023-09-12 DIAGNOSIS — E78.2: Primary | ICD-10-CM

## 2023-09-12 DIAGNOSIS — E66.01: ICD-10-CM

## 2023-09-12 LAB
ALBUMIN SERPL BCG-MCNC: 3.7 G/DL (ref 3.2–5.2)
ALP SERPL-CCNC: 119 U/L (ref 46–116)
ALT SERPL W P-5'-P-CCNC: 15 U/L (ref 7–40)
AST SERPL-CCNC: 11 U/L (ref ?–34)
BILIRUB SERPL-MCNC: 0.7 MG/DL (ref 0.3–1.2)
BUN SERPL-MCNC: 27 MG/DL (ref 9–23)
CALCIUM SERPL-MCNC: 9.8 MG/DL (ref 8.3–10.6)
CHLORIDE SERPL-SCNC: 105 MMOL/L (ref 98–107)
CHOLEST SERPL-MCNC: 107 MG/DL (ref ?–200)
CHOLEST/HDLC SERPL: 2.54 {RATIO} (ref ?–5)
CO2 SERPL-SCNC: 29 MMOL/L (ref 20–31)
CREAT SERPL-MCNC: 1.14 MG/DL (ref 0.7–1.3)
EOSINOPHIL NFR BLD MANUAL: 4 % (ref 0–3)
GFR SERPL CREATININE-BSD FRML MDRD: > 60 ML/MIN/{1.73_M2} (ref 42–?)
GLUCOSE SERPL-MCNC: 103 MG/DL (ref 74–106)
HCT VFR BLD AUTO: 49 % (ref 42–52)
HDLC SERPL-MCNC: 42 MG/DL (ref 40–?)
HGB BLD-MCNC: 15.9 G/DL (ref 13.5–17.5)
LDLC SERPL CALC-MCNC: 46.6 MG/DL (ref ?–100)
LYMPHOCYTES NFR BLD MANUAL: 12 % (ref 16–44)
MCH RBC QN AUTO: 32 PG (ref 27–33)
MCHC RBC AUTO-ENTMCNC: 32.4 G/DL (ref 32–36.5)
MCV RBC AUTO: 98.6 FL (ref 80–96)
MONOCYTES NFR BLD MANUAL: 4 % (ref 0–5)
NEUTROPHILS NFR BLD MANUAL: 60 % (ref 28–66)
NONHDLC SERPL-MCNC: 65 MG/DL
PLATELET # BLD AUTO: 124 10^3/UL (ref 150–450)
PLATELET BLD QL SMEAR: (no result)
POTASSIUM SERPL-SCNC: 4.5 MMOL/L (ref 3.5–5.1)
PROT SERPL-MCNC: 7.6 G/DL (ref 5.7–8.2)
RBC # BLD AUTO: 4.97 10^6/UL (ref 4.3–6.1)
SODIUM SERPL-SCNC: 141 MMOL/L (ref 136–145)
TRIGL SERPL-MCNC: 92 MG/DL (ref ?–150)
TSH SERPL DL<=0.005 MIU/L-ACNC: 1.43 UIU/ML (ref 0.55–4.78)
VARIANT LYMPHS NFR BLD MANUAL: 20 % (ref 0–5)
WBC # BLD AUTO: 8.2 10^3/UL (ref 4–10)

## 2024-03-13 ENCOUNTER — HOSPITAL ENCOUNTER (EMERGENCY)
Dept: HOSPITAL 53 - M ED | Age: 74
Discharge: HOME | End: 2024-03-13
Payer: MEDICARE

## 2024-03-13 VITALS — HEIGHT: 74 IN | BODY MASS INDEX: 25.72 KG/M2 | WEIGHT: 200.4 LBS

## 2024-03-13 VITALS — TEMPERATURE: 97.5 F | OXYGEN SATURATION: 94 %

## 2024-03-13 VITALS — SYSTOLIC BLOOD PRESSURE: 155 MMHG | DIASTOLIC BLOOD PRESSURE: 70 MMHG

## 2024-03-13 VITALS — OXYGEN SATURATION: 93 %

## 2024-03-13 DIAGNOSIS — K21.9: ICD-10-CM

## 2024-03-13 DIAGNOSIS — J01.90: ICD-10-CM

## 2024-03-13 DIAGNOSIS — F17.200: ICD-10-CM

## 2024-03-13 DIAGNOSIS — J20.9: Primary | ICD-10-CM

## 2024-03-13 DIAGNOSIS — E78.5: ICD-10-CM

## 2024-03-13 DIAGNOSIS — B97.89: ICD-10-CM

## 2024-03-13 DIAGNOSIS — I71.40: ICD-10-CM

## 2024-03-13 LAB
BASOPHILS # BLD AUTO: 0.1 10^3/UL (ref 0–0.2)
BASOPHILS NFR BLD AUTO: 0.5 % (ref 0–1)
BUN SERPL-MCNC: 39 MG/DL (ref 9–23)
CALCIUM SERPL-MCNC: 8.4 MG/DL (ref 8.3–10.6)
CHLORIDE SERPL-SCNC: 107 MMOL/L (ref 98–107)
CO2 SERPL-SCNC: 32 MMOL/L (ref 20–31)
CREAT SERPL-MCNC: 1.14 MG/DL (ref 0.7–1.3)
EOSINOPHIL # BLD AUTO: 0.4 10^3/UL (ref 0–0.5)
EOSINOPHIL NFR BLD AUTO: 3.4 % (ref 0–3)
GFR SERPL CREATININE-BSD FRML MDRD: > 60 ML/MIN/{1.73_M2} (ref 42–?)
GLUCOSE SERPL-MCNC: 111 MG/DL (ref 74–106)
HCT VFR BLD AUTO: 46.7 % (ref 42–52)
HGB BLD-MCNC: 15.8 G/DL (ref 13.5–17.5)
LYMPHOCYTES # BLD AUTO: 2.4 10^3/UL (ref 1.5–5)
LYMPHOCYTES NFR BLD AUTO: 22.5 % (ref 24–44)
MCH RBC QN AUTO: 32 PG (ref 27–33)
MCHC RBC AUTO-ENTMCNC: 33.8 G/DL (ref 32–36.5)
MCV RBC AUTO: 94.7 FL (ref 80–96)
MONOCYTES # BLD AUTO: 0.8 10^3/UL (ref 0–0.8)
MONOCYTES NFR BLD AUTO: 7.4 % (ref 2–8)
NEUTROPHILS # BLD AUTO: 6.9 10^3/UL (ref 1.5–8.5)
NEUTROPHILS NFR BLD AUTO: 64.8 % (ref 36–66)
PLATELET # BLD AUTO: 102 10^3/UL (ref 150–450)
POTASSIUM SERPL-SCNC: 4.5 MMOL/L (ref 3.5–5.1)
RBC # BLD AUTO: 4.93 10^6/UL (ref 4.3–6.1)
SODIUM SERPL-SCNC: 140 MMOL/L (ref 136–145)
WBC # BLD AUTO: 10.7 10^3/UL (ref 4–10)

## 2024-03-13 PROCEDURE — 93005 ELECTROCARDIOGRAM TRACING: CPT

## 2024-03-13 PROCEDURE — 87581 M.PNEUMON DNA AMP PROBE: CPT

## 2024-03-13 PROCEDURE — 99284 EMERGENCY DEPT VISIT MOD MDM: CPT

## 2024-03-13 PROCEDURE — 87633 RESP VIRUS 12-25 TARGETS: CPT

## 2024-03-13 PROCEDURE — 85025 COMPLETE CBC W/AUTO DIFF WBC: CPT

## 2024-03-13 PROCEDURE — 83880 ASSAY OF NATRIURETIC PEPTIDE: CPT

## 2024-03-13 PROCEDURE — 71045 X-RAY EXAM CHEST 1 VIEW: CPT

## 2024-03-13 PROCEDURE — 94640 AIRWAY INHALATION TREATMENT: CPT

## 2024-03-13 PROCEDURE — 80048 BASIC METABOLIC PNL TOTAL CA: CPT

## 2024-03-13 PROCEDURE — 87798 DETECT AGENT NOS DNA AMP: CPT

## 2024-03-13 PROCEDURE — 96374 THER/PROPH/DIAG INJ IV PUSH: CPT

## 2024-03-13 PROCEDURE — 87486 CHLMYD PNEUM DNA AMP PROBE: CPT

## 2024-03-13 RX ADMIN — METHYLPREDNISOLONE SODIUM SUCCINATE ONE MG: 125 INJECTION, POWDER, FOR SOLUTION INTRAMUSCULAR; INTRAVENOUS at 17:23

## 2024-03-13 RX ADMIN — IPRATROPIUM BROMIDE AND ALBUTEROL SULFATE SCH ML: .5; 3 SOLUTION RESPIRATORY (INHALATION) at 17:26
